# Patient Record
Sex: FEMALE | URBAN - METROPOLITAN AREA
[De-identification: names, ages, dates, MRNs, and addresses within clinical notes are randomized per-mention and may not be internally consistent; named-entity substitution may affect disease eponyms.]

---

## 2023-09-21 ENCOUNTER — WEB ENCOUNTER (OUTPATIENT)
Dept: URBAN - METROPOLITAN AREA CLINIC 17 | Facility: CLINIC | Age: 48
End: 2023-09-21

## 2023-09-25 ENCOUNTER — OFFICE VISIT (OUTPATIENT)
Dept: URBAN - METROPOLITAN AREA CLINIC 17 | Facility: CLINIC | Age: 48
End: 2023-09-25
Payer: COMMERCIAL

## 2023-09-25 ENCOUNTER — DASHBOARD ENCOUNTERS (OUTPATIENT)
Age: 48
End: 2023-09-25

## 2023-09-25 VITALS
TEMPERATURE: 97.5 F | SYSTOLIC BLOOD PRESSURE: 131 MMHG | HEART RATE: 88 BPM | DIASTOLIC BLOOD PRESSURE: 91 MMHG | WEIGHT: 184 LBS | HEIGHT: 63 IN | BODY MASS INDEX: 32.6 KG/M2

## 2023-09-25 DIAGNOSIS — K50.813 CROHN'S DISEASE OF BOTH SMALL AND LARGE INTESTINE WITH FISTULA: ICD-10-CM

## 2023-09-25 DIAGNOSIS — E66.9 OBESITY (BMI 30-39.9): ICD-10-CM

## 2023-09-25 PROBLEM — 162864005: Status: ACTIVE | Noted: 2023-09-25

## 2023-09-25 PROCEDURE — 99204 OFFICE O/P NEW MOD 45 MIN: CPT | Performed by: INTERNAL MEDICINE

## 2023-09-25 NOTE — HPI-TODAY'S VISIT:
9/25/23 47 yo lady who is here for a 2nd opinion to try to get some clarity,  She has no PCP. She is self referred.  Went for a routine screening colonoscopy with DR Lozano in 3/2023.  She had a normal colon, cecal granularity and TI stenosis. bx c/w chronic inflammation suggestive of Crohns disease.  Her only symptom is bloating.  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  There is fistulous formation at level of stricutre with upstream SB feces and distension to 3.3 cm  at that level with a complex fistula. Dr Lozano had recommended remicade but she had read about it and prefers to do Stelara.

## 2023-09-26 PROBLEM — 71833008: Status: ACTIVE | Noted: 2023-09-25
